# Patient Record
Sex: MALE | Race: AMERICAN INDIAN OR ALASKA NATIVE | NOT HISPANIC OR LATINO | URBAN - METROPOLITAN AREA
[De-identification: names, ages, dates, MRNs, and addresses within clinical notes are randomized per-mention and may not be internally consistent; named-entity substitution may affect disease eponyms.]

---

## 2018-09-08 ENCOUNTER — OUTPATIENT (OUTPATIENT)
Dept: OUTPATIENT SERVICES | Facility: HOSPITAL | Age: 48
LOS: 1 days | Discharge: HOME | End: 2018-09-08

## 2018-09-08 DIAGNOSIS — R80.9 PROTEINURIA, UNSPECIFIED: ICD-10-CM

## 2018-09-08 DIAGNOSIS — N18.2 CHRONIC KIDNEY DISEASE, STAGE 2 (MILD): ICD-10-CM

## 2019-03-13 ENCOUNTER — OUTPATIENT (OUTPATIENT)
Dept: OUTPATIENT SERVICES | Facility: HOSPITAL | Age: 49
LOS: 1 days | Discharge: HOME | End: 2019-03-13

## 2019-03-13 DIAGNOSIS — N18.1 CHRONIC KIDNEY DISEASE, STAGE 1: ICD-10-CM

## 2019-03-13 DIAGNOSIS — R80.9 PROTEINURIA, UNSPECIFIED: ICD-10-CM

## 2019-04-29 PROBLEM — Z00.00 ENCOUNTER FOR PREVENTIVE HEALTH EXAMINATION: Status: ACTIVE | Noted: 2019-04-29

## 2019-06-19 ENCOUNTER — APPOINTMENT (OUTPATIENT)
Dept: UROLOGY | Facility: CLINIC | Age: 49
End: 2019-06-19

## 2021-12-22 ENCOUNTER — OUTPATIENT (OUTPATIENT)
Dept: OUTPATIENT SERVICES | Facility: HOSPITAL | Age: 51
LOS: 1 days | Discharge: HOME | End: 2021-12-22
Payer: COMMERCIAL

## 2021-12-22 PROCEDURE — 78803 RP LOCLZJ TUM SPECT 1 AREA: CPT | Mod: 26

## 2021-12-22 PROCEDURE — 78306 BONE IMAGING WHOLE BODY: CPT | Mod: 26

## 2021-12-27 ENCOUNTER — OUTPATIENT (OUTPATIENT)
Dept: OUTPATIENT SERVICES | Facility: HOSPITAL | Age: 51
LOS: 1 days | Discharge: HOME | End: 2021-12-27
Payer: COMMERCIAL

## 2021-12-27 DIAGNOSIS — C61 MALIGNANT NEOPLASM OF PROSTATE: ICD-10-CM

## 2021-12-27 PROCEDURE — 74177 CT ABD & PELVIS W/CONTRAST: CPT | Mod: 26

## 2021-12-30 DIAGNOSIS — C61 MALIGNANT NEOPLASM OF PROSTATE: ICD-10-CM

## 2022-01-26 ENCOUNTER — NON-APPOINTMENT (OUTPATIENT)
Age: 52
End: 2022-01-26

## 2022-01-27 ENCOUNTER — APPOINTMENT (OUTPATIENT)
Dept: UROLOGY | Facility: CLINIC | Age: 52
End: 2022-01-27
Payer: COMMERCIAL

## 2022-01-27 VITALS — HEIGHT: 64 IN | BODY MASS INDEX: 24.75 KG/M2 | WEIGHT: 145 LBS | TEMPERATURE: 98 F

## 2022-01-27 DIAGNOSIS — Z78.9 OTHER SPECIFIED HEALTH STATUS: ICD-10-CM

## 2022-01-27 DIAGNOSIS — R39.9 UNSPECIFIED SYMPTOMS AND SIGNS INVOLVING THE GENITOURINARY SYSTEM: ICD-10-CM

## 2022-01-27 DIAGNOSIS — Z85.46 PERSONAL HISTORY OF MALIGNANT NEOPLASM OF PROSTATE: ICD-10-CM

## 2022-01-27 PROCEDURE — 99205 OFFICE O/P NEW HI 60 MIN: CPT

## 2022-01-27 NOTE — ASSESSMENT
[FreeTextEntry1] : LIZBETH SMITH is a 52 year old male who presents for consultation for intermediate risk prostate cancer w BPH/severe intravesical protrusion, bothersome LUTS.\par \par We discussed options focusing on surgical options and radiation.  Patient met with radiation specialist for proton beam and external beam and he declined any further consultation w rad onc. \par I explained my concern w regards to his significant BPH if he were to have radiation.  He is leaning towards surgery and while is deciding we will obtain an MRI of the prostate.\par Have pathology slides read in [facility]\par Follow-up after\par \par \par Our discussion summarized --\par The natural history of this disease was explained to the patient at length and the treatment options discussed including radical prostatectomy by open or robotic-assisted laparoscopic approach, external-beam radiotherapy, brachytherapy, and watchful waiting, including the probability of success and complications associated with these approaches.\par \par With respect to AS/watchful waiting, we discussed the difficulties of estimating the extent of disease preoperatively, the risk of cancer progression, and risk that salvage might not be possible with progression. \par However, the favorable long-term outcomes of active surveillance in appropriately selected patients was conveyed. \par \par With respect to seed implants, we discussed risks including but not limited to cancer recurrence, exacerbation of voiding symptoms or hematuria, urinary retention, induction of 2nd malignancy, and risks of rectal symptoms or bleeding.  We also discussed risks of the anesthesia including but not limited to MI, CVA, DVT, and PE.  \par With respect to EBRT, we discussed we discussed risks including but not limited to cancer recurrence, exacerbation of voiding symptoms or hematuria, urinary retention, incontinence, stricture of the urinary tract, induction of 2nd malignancy, and risks of rectal symptoms or bleeding.  We discussed fact that rectal symptoms may be more common with EBRT than with other treatment modalities. I did convey that the risk of erectile dysfunction was likely lower with EBRT, at least in the short-term.\par \par With radiation therapy, we discussed the difficulties in diagnosing recurrent disease at an early stage due to variability in PSA levels and the fact that most patients are not candidates for local salvage therapy when biochemical recurrence is declared.  We also discussed the significant morbidity of local salvage therapy in terms of perioperative complications, erectile dysfunction and urinary incontinence.\par \par With respect to radical prostatectomy, the pros and cons of open vs robotic-assisted laparoscopic prostatectomy were discussed. The complications of this procedure were reviewed with the patient and include (but not limited to) urinary incontinence, erectile dysfunction, infertility, anastomotic stricture, lymphocele, hemorrhage requiring transfusion, rectal, ureteral, or nerve injury, infection, cardiovascular, pulmonary, thromboembolic, and anesthetic complications.  For robotic prostatectomy, the additional complications of anastomotic urine leak and small bowel obstruction from adhesions was conveyed. We also discussed the need for a urethral catheter as well as a ANGEL drain post-operatively.\par \par With surgery, we also discussed the potential advantage over radiation therapy in that biochemical recurrence can be detected at a relatively earlier stage and that salvage radiotherapy is successful in controlling recurrent disease in a substantial proportion of patients.  I also conveyed that salvage radiotherapy was associated with a considerably more favorable morbidity profile compared to local salvage therapies for radiorecurrent disease. \par \par We also discussed prostate cryotherapy in detail, including aspects related to the procedure and the outcomes with respect to cancer control, urinary dysfunction, impotence, and morbidity.\par \par All of the patient questions were answered.  \par \par \par \par \par

## 2022-01-27 NOTE — HISTORY OF PRESENT ILLNESS
[FreeTextEntry1] : LIZBETH SMITH is a 52 year old male who presents for consultation for intermediate risk prostate cancer.\par \par This was found as patient had an elevated PSA 4.27.  Does report bothersome lower urinary tract symptoms weak stream with hesitancy nocturia 3 times.\par Denies gross hematuria, dysuria or associated symptoms. \par \par Pathology [place] 3+4 prostate cancer 4 of 12 cores positive unable to read poorly scanned document with regards to percentage of core.  TRUS prostate biopsy December 2021\par \par Records reviewed\par 85 g prostate\par Decipher score low risk\par \par Bone scan negative for metastatic disease, questionable site of uptake with subsequent negative x-ray, x-ray shows benign sclerotic density distal right femur\par CT abdomen pelvis images visualized negative for metastatic disease severe intravesical prostatic protrusion hydronephrosis bilaterally\par \par Denies  PMH including previous kidney stones, recurrent UTIs. 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

## 2022-01-28 ENCOUNTER — NON-APPOINTMENT (OUTPATIENT)
Age: 52
End: 2022-01-28

## 2022-02-04 ENCOUNTER — APPOINTMENT (OUTPATIENT)
Dept: UROLOGY | Facility: CLINIC | Age: 52
End: 2022-02-04

## 2022-02-07 ENCOUNTER — APPOINTMENT (OUTPATIENT)
Dept: RADIATION ONCOLOGY | Facility: CLINIC | Age: 52
End: 2022-02-07
Payer: COMMERCIAL

## 2022-02-07 DIAGNOSIS — Z80.3 FAMILY HISTORY OF MALIGNANT NEOPLASM OF BREAST: ICD-10-CM

## 2022-02-07 PROCEDURE — 99024 POSTOP FOLLOW-UP VISIT: CPT

## 2022-02-07 RX ORDER — TAMSULOSIN HYDROCHLORIDE 0.4 MG/1
0.4 CAPSULE ORAL
Qty: 90 | Refills: 3 | Status: ACTIVE | COMMUNITY
Start: 2022-02-07 | End: 1900-01-01

## 2022-02-07 NOTE — REVIEW OF SYSTEMS
[Nocturia] : nocturia [Urinary Frequency] : urinary frequency [Negative] : Psychiatric [Hematuria: Grade 0] : Hematuria: Grade 0 [Urinary Incontinence: Grade 0] : Urinary Incontinence: Grade 0  [Urinary Retention: Grade 1 - Urinary, suprapubic or intermittent catheter placement not indicated; able to void with some residual] : Urinary Retention: Grade 1 - Urinary, suprapubic or intermittent catheter placement not indicated; able to void with some residual [Urinary Tract Pain: Grade 0] : Urinary Tract Pain: Grade 0 [Urinary Urgency: Grade 1 - Present] : Urinary Urgency: Grade 1 - Present [Urinary Frequency: Grade 1 - Present] : Urinary Frequency: Grade 1 - Present [Ejaculation Disorder: Grade 0] : Ejaculation Disorder: Grade 0 [Erectile Dysfunction: Grade 0] : Erectile Dysfunction: Grade 0

## 2022-02-07 NOTE — HISTORY OF PRESENT ILLNESS
[Home] : at home, [unfilled] , at the time of the visit. [Medical Office: ([facility])___] : at the medical office located in  [Verbal consent obtained from patient] : the patient, [unfilled] [FreeTextEntry1] : Mr. Boogie is a 52 year old male with Grand View score 3+4=7 adenocarcinoma of the prostate.  He was referred by Dr. Pedraza who patient consulted on 1/27/22 for elevated PSA of 4.27 ng/ml and prostate cancer diagnosis. \par \par Patient underwent prostate biopsy on 12/6/21, pathology report indicated 4 out of 12 cores positive for cancer with [place] score 3+4 =7 in all cores, involving 3%-7% of the tissue.  Prostate volume 85 g.\par \par Bone scan was done 12/22/21 which shows punctate increased uptake in the distal right femoral diaphysis. X-Ray of the right femur recommended.  Two sites of irregular shaped increased uptake in the right frontal bone and 1 site in the left parietal bone. X-Ray of skull is suggested.  These may represent contamination with radioactive urine (via the finger).  No other potentially suspicious abnormalities was noted.  \par \par CT of abdomen/pelvis done on 12/27/21 showed no evidence of metastatic disease or lymphadenopathy.  Marked prostatomegaly was noted.  \par \par Decipher score was 0.21 \par \par Patient presents today to discuss his radiation therapy option. Patient report nocturia 2-3 times per night, urgency, hesitancy, daytime frequency, weak urinary flow, and feeling of not emptying his bladder completely. He denies dysuria, and hematuria. He is not sexually active but no issues with his erection. He has regular daily bowel function. Patient report that his femoral and skull x-rays are negative.\par

## 2022-02-07 NOTE — DISEASE MANAGEMENT
[1] : T1 [c] : c [0] : M0 [0-10] : 0 -10 ng/mL [Biopsy] : Patient had a biopsy on [7(3+4)] : Template Biopsy Broadbent Score: 7(3+4) [] : Patient had a bone scan [IIB] : IIB [BiopsyDate] : 12/2021 [TotalCores] : 12 [TotalPositiveCores] : 4 [MaxCoreInvolvement] : 7 [CTresults] : 12/27/21 [BoneScanResults] : 12/22/21

## 2022-02-15 ENCOUNTER — APPOINTMENT (OUTPATIENT)
Dept: RADIATION ONCOLOGY | Facility: HOSPITAL | Age: 52
End: 2022-02-15
Payer: COMMERCIAL

## 2022-02-15 ENCOUNTER — OUTPATIENT (OUTPATIENT)
Dept: OUTPATIENT SERVICES | Facility: HOSPITAL | Age: 52
LOS: 1 days | Discharge: HOME | End: 2022-02-15

## 2022-02-15 VITALS
SYSTOLIC BLOOD PRESSURE: 131 MMHG | RESPIRATION RATE: 16 BRPM | WEIGHT: 149.38 LBS | HEART RATE: 69 BPM | OXYGEN SATURATION: 100 % | BODY MASS INDEX: 25.5 KG/M2 | DIASTOLIC BLOOD PRESSURE: 82 MMHG | TEMPERATURE: 97.3 F | HEIGHT: 64 IN

## 2022-02-15 DIAGNOSIS — C61 MALIGNANT NEOPLASM OF PROSTATE: ICD-10-CM

## 2022-02-15 PROCEDURE — 99214 OFFICE O/P EST MOD 30 MIN: CPT

## 2022-02-15 RX ORDER — PNV NO.95/FERROUS FUM/FOLIC AC 28MG-0.8MG
TABLET ORAL
Refills: 0 | Status: ACTIVE | COMMUNITY

## 2022-02-15 RX ORDER — ASCORBIC ACID 500 MG
TABLET ORAL
Refills: 0 | Status: ACTIVE | COMMUNITY

## 2022-02-15 RX ORDER — CHROMIUM 200 MCG
TABLET ORAL
Refills: 0 | Status: ACTIVE | COMMUNITY

## 2022-02-15 RX ORDER — TURMERIC ROOT EXTRACT 500 MG
TABLET ORAL
Refills: 0 | Status: ACTIVE | COMMUNITY

## 2022-02-16 PROBLEM — C61 PROSTATE CANCER: Status: ACTIVE | Noted: 2022-01-27

## 2022-02-16 NOTE — HISTORY OF PRESENT ILLNESS
[FreeTextEntry1] : Daniel was seen in a follow-up visit for his prostate cancer.  He has now seen three urologists and I am the fourth radiation oncologist.  I was asked by Dr. Ovalles to continue with his evaluation as he lives on [address].  In summary:\par -he is a 52 year old with a PSA of 4.2, who underwent a TRUS guided biopsy of the prostate in December 2021.\par -this found [place] 3+4 cancer in four cores from the left.  \par -the Decipher assay rated his genomic risk as low with a 15 yr risk of prostate cancer mortality of 1.8% and < 1% risk of metastasis.  \par -his gland measured 85 cc on MRI (see CT above)\par -he has a longstanding history of LUTS\par -only recently has he started taking tamsulosin, with with some benefit\par \par He is here to continue his debate about choice of treatment.

## 2022-02-16 NOTE — REVIEW OF SYSTEMS
[Patient Intake Form Reviewed] : Patient intake form was reviewed [Wheezing] : wheezing [Negative] : Allergic/Immunologic [EPIC-CP Score (0-60): ___] : EPIC-CP score: [unfilled] [FreeTextEntry4] : sinus problems [FreeTextEntry6] : asthma

## 2022-02-16 NOTE — PHYSICAL EXAM
[Normal] : oriented to person, place and time, the affect was normal, the mood was normal and not anxious [FreeTextEntry1] : rectal exam was deferred

## 2022-03-10 ENCOUNTER — APPOINTMENT (OUTPATIENT)
Dept: UROLOGY | Facility: CLINIC | Age: 52
End: 2022-03-10

## 2022-08-30 ENCOUNTER — APPOINTMENT (OUTPATIENT)
Dept: PLASTIC SURGERY | Facility: CLINIC | Age: 52
End: 2022-08-30

## 2022-08-30 VITALS — BODY MASS INDEX: 24.75 KG/M2 | HEIGHT: 64 IN | WEIGHT: 145 LBS

## 2022-08-30 PROCEDURE — 99203 OFFICE O/P NEW LOW 30 MIN: CPT

## 2022-08-30 NOTE — PHYSICAL EXAM
[de-identified] : well developed male, NAD [de-identified] : NC/AT [de-identified] : unlabored breathing [de-identified] : JMR [de-identified] : soft, nontender  [de-identified] : Forehead - left superior forehead with 2.5 x o.7 cm raised, brown skin lesion c/w nevus sebaceous in appearance, right lateral eyebrow with healing biopsy site

## 2022-08-30 NOTE — ASSESSMENT
[FreeTextEntry1] : 53 yo M  with forehead skin lesions indicated for excision. \par \par - recommend excision of both lesions in TRAY\par \par Regarding the procedure, we discussed scarring, poor wound healing, bleeding, infection, need for additional surgery, and dissatisfaction with the outcome.  Also discussed possibility of keloid and/or hypertrophic scar formation as well as recurrence.  All questions were answered and risks understood.\par \par Due to COVID 19, pre-visit patient instructions were explained to the patient and their symptoms were checked upon arrival.  \par Masks were used by the health care providers and staff and the examination room was cleaned after the patient visit was completed.\par

## 2022-08-30 NOTE — HISTORY OF PRESENT ILLNESS
[FreeTextEntry1] : 53 yo M with PMHx of Prostate Ca s/p prostatectomy who presents today for evaluation of forehead skin lesions x2. 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 Right forehead mass is new and recent biopsy revealed syringocystadenoma with positive margins. Pt presents today to discuss complete excision. Denies any personal or family h/o skin cancer. \par \par \par Occupation -  \par Nonsmoker

## 2022-09-16 ENCOUNTER — LABORATORY RESULT (OUTPATIENT)
Age: 52
End: 2022-09-16

## 2022-10-05 ENCOUNTER — OUTPATIENT (OUTPATIENT)
Dept: OUTPATIENT SERVICES | Facility: HOSPITAL | Age: 52
LOS: 1 days | Discharge: HOME | End: 2022-10-05

## 2022-10-05 VITALS
OXYGEN SATURATION: 99 % | HEIGHT: 64 IN | SYSTOLIC BLOOD PRESSURE: 126 MMHG | TEMPERATURE: 98 F | HEART RATE: 80 BPM | DIASTOLIC BLOOD PRESSURE: 75 MMHG | RESPIRATION RATE: 14 BRPM | WEIGHT: 145.06 LBS

## 2022-10-05 DIAGNOSIS — Z01.818 ENCOUNTER FOR OTHER PREPROCEDURAL EXAMINATION: ICD-10-CM

## 2022-10-05 DIAGNOSIS — D48.5 NEOPLASM OF UNCERTAIN BEHAVIOR OF SKIN: ICD-10-CM

## 2022-10-05 DIAGNOSIS — Z90.79 ACQUIRED ABSENCE OF OTHER GENITAL ORGAN(S): Chronic | ICD-10-CM

## 2022-10-05 LAB
ALBUMIN SERPL ELPH-MCNC: 5 G/DL — SIGNIFICANT CHANGE UP (ref 3.5–5.2)
ALP SERPL-CCNC: 95 U/L — SIGNIFICANT CHANGE UP (ref 30–115)
ALT FLD-CCNC: 13 U/L — SIGNIFICANT CHANGE UP (ref 0–41)
ANION GAP SERPL CALC-SCNC: 12 MMOL/L — SIGNIFICANT CHANGE UP (ref 7–14)
APTT BLD: 38.9 SEC — SIGNIFICANT CHANGE UP (ref 27–39.2)
AST SERPL-CCNC: 17 U/L — SIGNIFICANT CHANGE UP (ref 0–41)
BASOPHILS # BLD AUTO: 0.07 K/UL — SIGNIFICANT CHANGE UP (ref 0–0.2)
BASOPHILS NFR BLD AUTO: 1.4 % — HIGH (ref 0–1)
BILIRUB SERPL-MCNC: 0.5 MG/DL — SIGNIFICANT CHANGE UP (ref 0.2–1.2)
BUN SERPL-MCNC: 17 MG/DL — SIGNIFICANT CHANGE UP (ref 10–20)
CALCIUM SERPL-MCNC: 9.4 MG/DL — SIGNIFICANT CHANGE UP (ref 8.4–10.5)
CHLORIDE SERPL-SCNC: 102 MMOL/L — SIGNIFICANT CHANGE UP (ref 98–110)
CO2 SERPL-SCNC: 26 MMOL/L — SIGNIFICANT CHANGE UP (ref 17–32)
CREAT SERPL-MCNC: 1.1 MG/DL — SIGNIFICANT CHANGE UP (ref 0.7–1.5)
EGFR: 81 ML/MIN/1.73M2 — SIGNIFICANT CHANGE UP
EOSINOPHIL # BLD AUTO: 0.14 K/UL — SIGNIFICANT CHANGE UP (ref 0–0.7)
EOSINOPHIL NFR BLD AUTO: 2.8 % — SIGNIFICANT CHANGE UP (ref 0–8)
GLUCOSE SERPL-MCNC: 107 MG/DL — HIGH (ref 70–99)
HCT VFR BLD CALC: 45.2 % — SIGNIFICANT CHANGE UP (ref 42–52)
HGB BLD-MCNC: 15.2 G/DL — SIGNIFICANT CHANGE UP (ref 14–18)
IMM GRANULOCYTES NFR BLD AUTO: 0.2 % — SIGNIFICANT CHANGE UP (ref 0.1–0.3)
INR BLD: 1 RATIO — SIGNIFICANT CHANGE UP (ref 0.65–1.3)
LYMPHOCYTES # BLD AUTO: 1.53 K/UL — SIGNIFICANT CHANGE UP (ref 1.2–3.4)
LYMPHOCYTES # BLD AUTO: 30.4 % — SIGNIFICANT CHANGE UP (ref 20.5–51.1)
MCHC RBC-ENTMCNC: 29.4 PG — SIGNIFICANT CHANGE UP (ref 27–31)
MCHC RBC-ENTMCNC: 33.6 G/DL — SIGNIFICANT CHANGE UP (ref 32–37)
MCV RBC AUTO: 87.4 FL — SIGNIFICANT CHANGE UP (ref 80–94)
MONOCYTES # BLD AUTO: 0.52 K/UL — SIGNIFICANT CHANGE UP (ref 0.1–0.6)
MONOCYTES NFR BLD AUTO: 10.3 % — HIGH (ref 1.7–9.3)
NEUTROPHILS # BLD AUTO: 2.76 K/UL — SIGNIFICANT CHANGE UP (ref 1.4–6.5)
NEUTROPHILS NFR BLD AUTO: 54.9 % — SIGNIFICANT CHANGE UP (ref 42.2–75.2)
NRBC # BLD: 0 /100 WBCS — SIGNIFICANT CHANGE UP (ref 0–0)
PLATELET # BLD AUTO: 297 K/UL — SIGNIFICANT CHANGE UP (ref 130–400)
POTASSIUM SERPL-MCNC: 4.4 MMOL/L — SIGNIFICANT CHANGE UP (ref 3.5–5)
POTASSIUM SERPL-SCNC: 4.4 MMOL/L — SIGNIFICANT CHANGE UP (ref 3.5–5)
PROT SERPL-MCNC: 6.7 G/DL — SIGNIFICANT CHANGE UP (ref 6–8)
PROTHROM AB SERPL-ACNC: 11.4 SEC — SIGNIFICANT CHANGE UP (ref 9.95–12.87)
RBC # BLD: 5.17 M/UL — SIGNIFICANT CHANGE UP (ref 4.7–6.1)
RBC # FLD: 13.3 % — SIGNIFICANT CHANGE UP (ref 11.5–14.5)
SODIUM SERPL-SCNC: 140 MMOL/L — SIGNIFICANT CHANGE UP (ref 135–146)
WBC # BLD: 5.03 K/UL — SIGNIFICANT CHANGE UP (ref 4.8–10.8)
WBC # FLD AUTO: 5.03 K/UL — SIGNIFICANT CHANGE UP (ref 4.8–10.8)

## 2022-10-05 PROCEDURE — 93010 ELECTROCARDIOGRAM REPORT: CPT

## 2022-10-05 NOTE — H&P PST ADULT - HISTORY OF PRESENT ILLNESS
_52  yo male presents for PAST in preparation for EXCISION OF RIGHT AND LEFT FOREHEAD LESIONS  Pt complains of past medical hiostory of prostate cancer, no chemo or radiation was necessary ( proctectomy done 3/4/22), now he is scheduled for moles removal. Per pt they are "not cancerous"    Denies any chest pain, difficulty breathing, SOB, palpitations, dysuria, URI, or any other infections in the last 2 weeks/1 month. Denies any recent travel, contact, or exposure to any persons with known or suspected COVID-19. Pt also denies COVID testing within the last 2 weeks. Pt advised to self quarantine until day of procedure. Exercise tolerance of 2-3 flights of stairs without dyspnea. VOLODYMYR reviewed with patient.  Pt states that he runs  daily -5 miles.   Anesthesia Alert  NO--Difficult Airway  NO--History of neck surgery or radiation  NO--Limited ROM of neck  NO--History of Malignant hyperthermia  NO--Personal or family history of Pseudocholinesterase deficiency.  NO--Prior Anesthesia Complication  NO--Latex Allergy  NO--Loose teeth  NO--History of Rheumatoid Arthritis  YES--VOLODYMYR  NO--Bleeding risk  NO--Other   written and verbal instructions with teach back on chlorhexidine shampoo provided,  pt verbalized understanding with returned demonstration  Patient verbalized understanding of instructions and was given the opportunity to ask questions and have them answered.   _52  yo male presents for PAST in preparation for EXCISION OF RIGHT AND LEFT FOREHEAD LESIONS  Pt complains of past medical history of prostate cancer, no chemo or radiation was necessary ( proctectomy done 3/4/22), now he is scheduled for moles removal. Per pt the moles are "not cancerous" but can become if not removed.    Denies any chest pain, difficulty breathing, SOB, palpitations, dysuria, URI, or any other infections in the last 2 weeks/1 month. Denies any recent travel, contact, or exposure to any persons with known or suspected COVID-19. Pt also denies COVID testing within the last 2 weeks. Pt advised to self quarantine until day of procedure. Exercise tolerance of 2-3 flights of stairs without dyspnea. VOLODYMYR reviewed with patient.  Pt states that he runs  daily -5 miles.   Anesthesia Alert  NO--Difficult Airway  NO--History of neck surgery or radiation  NO--Limited ROM of neck  NO--History of Malignant hyperthermia  NO--Personal or family history of Pseudocholinesterase deficiency.  NO--Prior Anesthesia Complication  NO--Latex Allergy  NO--Loose teeth  NO--History of Rheumatoid Arthritis  YES--VOLODYMYR  NO--Bleeding risk  NO--Other   written and verbal instructions with teach back on chlorhexidine shampoo provided,  pt verbalized understanding with returned demonstration  Patient verbalized understanding of instructions and was given the opportunity to ask questions and have them answered.

## 2022-10-05 NOTE — H&P PST ADULT - REASON FOR ADMISSION
Case Type: OP Block TimeSuite: CASProceduralist: Daniele Fabian  Confirmed Surgery DateTime: 10-  Procedure: EXCISION OF RIGHT AND LEFT FOREHEAD LESIONS  Laterality: BilateralLength of Procedure: 90 Minutes  Anesthesia Type: General

## 2022-10-05 NOTE — H&P PST ADULT - NSICDXFAMILYHX_GEN_ALL_CORE_FT
FAMILY HISTORY:  Mother  Still living? Yes, Estimated age: 71-80  Family history of diabetes mellitus (DM), Age at diagnosis: Age Unknown

## 2022-10-24 ENCOUNTER — LABORATORY RESULT (OUTPATIENT)
Age: 52
End: 2022-10-24

## 2022-10-26 NOTE — ASU PATIENT PROFILE, ADULT - FALL HARM RISK - UNIVERSAL INTERVENTIONS
Bed in lowest position, wheels locked, appropriate side rails in place/Call bell, personal items and telephone in reach/Instruct patient to call for assistance before getting out of bed or chair/Non-slip footwear when patient is out of bed/[place] to call system/Physically safe environment - no spills, clutter or unnecessary equipment/Purposeful Proactive Rounding/Room/bathroom lighting operational, light cord in reach

## 2022-10-27 ENCOUNTER — RESULT REVIEW (OUTPATIENT)
Age: 52
End: 2022-10-27

## 2022-10-27 ENCOUNTER — APPOINTMENT (OUTPATIENT)
Dept: PLASTIC SURGERY | Facility: AMBULATORY SURGERY CENTER | Age: 52
End: 2022-10-27

## 2022-10-27 ENCOUNTER — TRANSCRIPTION ENCOUNTER (OUTPATIENT)
Age: 52
End: 2022-10-27

## 2022-10-27 ENCOUNTER — OUTPATIENT (OUTPATIENT)
Dept: OUTPATIENT SERVICES | Facility: HOSPITAL | Age: 52
LOS: 1 days | Discharge: HOME | End: 2022-10-27

## 2022-10-27 VITALS
DIASTOLIC BLOOD PRESSURE: 63 MMHG | SYSTOLIC BLOOD PRESSURE: 114 MMHG | RESPIRATION RATE: 16 BRPM | HEART RATE: 64 BPM | OXYGEN SATURATION: 96 %

## 2022-10-27 VITALS
SYSTOLIC BLOOD PRESSURE: 114 MMHG | WEIGHT: 143.08 LBS | DIASTOLIC BLOOD PRESSURE: 78 MMHG | HEIGHT: 64 IN | OXYGEN SATURATION: 99 % | RESPIRATION RATE: 19 BRPM | TEMPERATURE: 98 F | HEART RATE: 73 BPM

## 2022-10-27 DIAGNOSIS — Z90.79 ACQUIRED ABSENCE OF OTHER GENITAL ORGAN(S): Chronic | ICD-10-CM

## 2022-10-27 PROCEDURE — 14040 TIS TRNFR F/C/C/M/N/A/G/H/F: CPT

## 2022-10-27 PROCEDURE — 11444 EXC FACE-MM B9+MARG 3.1-4 CM: CPT | Mod: 59

## 2022-10-27 PROCEDURE — 88305 TISSUE EXAM BY PATHOLOGIST: CPT | Mod: 26

## 2022-10-27 PROCEDURE — 13132 CMPLX RPR F/C/C/M/N/AX/G/H/F: CPT | Mod: 59

## 2022-10-27 RX ORDER — TRAMADOL HYDROCHLORIDE 50 MG/1
1 TABLET ORAL
Qty: 12 | Refills: 0
Start: 2022-10-27 | End: 2022-10-30

## 2022-10-27 RX ORDER — HYDROMORPHONE HYDROCHLORIDE 2 MG/ML
0.5 INJECTION INTRAMUSCULAR; INTRAVENOUS; SUBCUTANEOUS
Refills: 0 | Status: DISCONTINUED | OUTPATIENT
Start: 2022-10-27 | End: 2022-10-27

## 2022-10-27 RX ORDER — ONDANSETRON 8 MG/1
4 TABLET, FILM COATED ORAL ONCE
Refills: 0 | Status: DISCONTINUED | OUTPATIENT
Start: 2022-10-27 | End: 2022-11-10

## 2022-10-27 RX ORDER — SODIUM CHLORIDE 9 MG/ML
1000 INJECTION, SOLUTION INTRAVENOUS
Refills: 0 | Status: DISCONTINUED | OUTPATIENT
Start: 2022-10-27 | End: 2022-11-10

## 2022-10-27 RX ORDER — ACETAMINOPHEN 500 MG
650 TABLET ORAL ONCE
Refills: 0 | Status: COMPLETED | OUTPATIENT
Start: 2022-10-27 | End: 2022-10-27

## 2022-10-27 RX ADMIN — Medication 650 MILLIGRAM(S): at 12:32

## 2022-10-27 NOTE — ASU DISCHARGE PLAN (ADULT/PEDIATRIC) - CALL YOUR DOCTOR IF YOU HAVE ANY OF THE FOLLOWING:
General: no weakness, no fatigue  Neck: Nontender  Respiratory: No cough, no shortness of breath  Cardiac: Negative  GI: No abdominal distension, no diarrhea, no vomiting, no nausea, no constipation  Extremities: No swelling Bleeding that does not stop/Swelling that gets worse/Pain not relieved by Medications/Wound/Surgical Site with redness, or foul smelling discharge or pus/Numbness, tingling, color or temperature change to extremity

## 2022-10-27 NOTE — ASU DISCHARGE PLAN (ADULT/PEDIATRIC) - ASU DC SPECIAL INSTRUCTIONSFT
You may shower from the neck down. Please leave the dressings in place to fall off on their own. When the dressing falls off, you can apply bacitracin twice a day to the incisions.     Please take over the counter medications for pain and Tramadol for severe pain as needed.    Please see Dr. Fabian in 1 week for follow up.

## 2022-10-27 NOTE — BRIEF OPERATIVE NOTE - NSICDXBRIEFPROCEDURE_GEN_ALL_CORE_FT
PROCEDURES:  Excision, lesion, benign, scalp, greater than 5.0 cm in diameter 27-Oct-2022 13:53:50  Isael Acosta

## 2022-10-27 NOTE — ASU DISCHARGE PLAN (ADULT/PEDIATRIC) - CARE PROVIDER_API CALL
Daniele Fabian)  Plastic Surgery; Surgery of the Hand  [address] [place] [address], [address]  [place] [address]  Phone: [phone]  Fax: [phone]  Follow Up Time: 1 week

## 2022-10-27 NOTE — ASU DISCHARGE PLAN (ADULT/PEDIATRIC) - NS MD DC FALL RISK RISK
For information on Fall & Injury Prevention, visit: [email or web address] OR  [email or web address] OR  https://www.cdc.gov/steadi/patient.html

## 2022-10-31 LAB — SURGICAL PATHOLOGY STUDY: SIGNIFICANT CHANGE UP

## 2022-11-01 DIAGNOSIS — D23.39 OTHER BENIGN NEOPLASM OF SKIN OF OTHER PARTS OF FACE: ICD-10-CM

## 2022-11-01 DIAGNOSIS — Z90.79 ACQUIRED ABSENCE OF OTHER GENITAL ORGAN(S): ICD-10-CM

## 2022-11-01 DIAGNOSIS — G47.33 OBSTRUCTIVE SLEEP APNEA (ADULT) (PEDIATRIC): ICD-10-CM

## 2022-11-01 DIAGNOSIS — Z85.46 PERSONAL HISTORY OF MALIGNANT NEOPLASM OF PROSTATE: ICD-10-CM

## 2022-11-01 DIAGNOSIS — D22.39 MELANOCYTIC NEVI OF OTHER PARTS OF FACE: ICD-10-CM

## 2022-11-02 ENCOUNTER — APPOINTMENT (OUTPATIENT)
Dept: PLASTIC SURGERY | Facility: CLINIC | Age: 52
End: 2022-11-02

## 2022-11-02 PROBLEM — C61 MALIGNANT NEOPLASM OF PROSTATE: Chronic | Status: ACTIVE | Noted: 2022-10-05

## 2022-11-02 PROBLEM — G47.33 OBSTRUCTIVE SLEEP APNEA (ADULT) (PEDIATRIC): Chronic | Status: ACTIVE | Noted: 2022-10-05

## 2022-11-02 NOTE — HISTORY OF PRESENT ILLNESS
[FreeTextEntry1] : 53 yo M with PMHx of Prostate Ca s/p prostatectomy who presents today for evaluation of forehead skin lesions x2. 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 Right forehead mass is new and recent biopsy revealed syringocystadenoma with positive margins. Pt presents today to discuss complete excision. Denies any personal or family h/o skin cancer. \par \par \par Occupation -  \par Nonsmoker \par \par Interval hx (11/2/22): Pt is POD# 6 s/p excision of 2x forehead lesions (per path, Salpingocystadenoma in background of nevus). Doing well. Denies fever/chills/drainage from incision sites. Pt had significant n/v after general anesthesia, now resolved.

## 2022-11-02 NOTE — PHYSICAL EXAM
[de-identified] : well developed male, NAD [de-identified] : NC/AT [de-identified] : unlabored breathing [de-identified] : JMR [de-identified] : soft, nontender  [de-identified] : Forehead - left superior forehead incision & right lateral eyebrow incision extending to R lateral canthus are both CDI, skin very well approximated, no seroma/hematoma/cellulitis or active drainage. Running suture in place

## 2022-11-02 NOTE — ASSESSMENT
[FreeTextEntry1] : 53 yo M Chapis choi with forehead skin lesions indicated for excision. \par \par  Pt is POD# 6 s/p excision of 2x forehead lesions (per path, Salpingocystadenoma in background of nevus). Doing very well \par \par - D/c sutures\par - All bandages changed, steri strips placed\par - Scar management: Aquaphor once steris fall off\par - Daily SPF\par - signs and symptoms of infection reviewed\par - No heavy lifting/pushing/pulling > 10lbs until 6 weeks from surgery\par - No cardiovascular activity / inc heart rate until 3 weeks from surgery\par - Path reviewed: Will discuss results with Pathologist and . Adv pt margins clear however would like to confirm final dx\par - All post op instructions reviewed, all questions answered\par - f/u 2 weeks for scar management\par \par \par Seen with SERGIO Cavanaugh \par \par Due to COVID 19, pre-visit patient instructions were explained to the patient and their symptoms were checked upon arrival.  \par Masks were used by the health care providers and staff and the examination room was cleaned after the patient visit was completed.\par

## 2022-11-21 ENCOUNTER — APPOINTMENT (OUTPATIENT)
Dept: PLASTIC SURGERY | Facility: CLINIC | Age: 52
End: 2022-11-21

## 2022-11-21 DIAGNOSIS — D23.9 OTHER BENIGN NEOPLASM OF SKIN, UNSPECIFIED: ICD-10-CM

## 2022-11-21 PROCEDURE — 99024 POSTOP FOLLOW-UP VISIT: CPT

## 2022-11-21 NOTE — ASSESSMENT
[FreeTextEntry1] : 51 yo M  with forehead skin lesions indicated for excision. \par \par Pt is 1 month s/p excision of 2x forehead syringocystadenoma papilliferum. Doing very well. \par \par - Scar management:Ok to start scar guard daily\par - Daily SPF\par - Recommended establish care with dermatology just for routine f/us\par - signs and symptoms of infection reviewed\par - Cleared for activity\par - Path reviewed again, print out given to pt \par - All post op instructions reviewed, all questions answered\par - f/u 6 months with \par \par Due to COVID 19, pre-visit patient instructions were explained to the patient and their symptoms were checked upon arrival.  \par Masks were used by the health care providers and staff and the examination room was cleaned after the patient visit was completed.\par

## 2022-11-21 NOTE — HISTORY OF PRESENT ILLNESS
[FreeTextEntry1] : 51 yo M with PMHx of Prostate Ca s/p prostatectomy who presents today for evaluation of forehead skin lesions x2. 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 Right forehead mass is new and recent biopsy revealed syringocystadenoma with positive margins. Pt presents today to discuss complete excision. Denies any personal or family h/o skin cancer. \par \par \par Occupation -  \par Nonsmoker \par \par Interval hx (11/2/22): Pt is POD# 6 s/p excision of 2x forehead lesions (per path, Salpingocystadenoma in background of nevus). Doing well. Denies fever/chills/drainage from incision sites. Pt had significant n/v after general anesthesia, now resolved. \par \par Interval hx (11/21/22): Pt is 1 month s/p excision of 2x forehead lesions (per UPDATED path after s/w pathologist, pt with benign Syringocystadenoma papilliferum). Doing well, using aquaphor to incisions daily. No fever/chills or drainage.

## 2022-11-21 NOTE — PHYSICAL EXAM
[de-identified] : well developed male, NAD [de-identified] : NC/AT [de-identified] : unlabored breathing [de-identified] : JMR [de-identified] : soft, nontender  [de-identified] : Forehead - left superior forehead incision & right lateral eyebrow incision extending to R lateral canthus are both CDI, skin very well approximated & healing well without any issues to note. No open areas, no signs of infection

## 2023-05-25 ENCOUNTER — APPOINTMENT (OUTPATIENT)
Dept: PLASTIC SURGERY | Facility: CLINIC | Age: 53
End: 2023-05-25
Payer: COMMERCIAL

## 2023-05-25 PROCEDURE — 99212 OFFICE O/P EST SF 10 MIN: CPT

## 2023-05-25 NOTE — ASSESSMENT
[FreeTextEntry1] : 51 yo M  with forehead skin lesions indicated for excision. \par \par Pt is 1 month s/p excision of 2x forehead syringocystadenoma papilliferum. Doing very well. \par \par - Scar management:Ok to start scar guard daily\par - Daily SPF\par - Recommended establish care with dermatology just for routine f/us\par - signs and symptoms of infection reviewed\par - Cleared for activity\par - Path reviewed again, print out given to pt \par - All post op instructions reviewed, all questions answered\par - f/u 6 months with \par \par Due to COVID 19, pre-visit patient instructions were explained to the patient and their symptoms were checked upon arrival.  \par Masks were used by the health care providers and staff and the examination room was cleaned after the patient visit was completed.\par \par \par 5/25/2023\par as above\par right and left forehead scars fine\par no recurrence\par \par plan exicse rigth cheek pigmented keratosis in fall\par \par to see derm prior\par \par Regarding the procedure, we discussed scarring, poor wound healing, bleeding, infection, need for additional surgery, and dissatisfaction with the outcome.  Also discussed possibility of keloid and/or hypertrophic scar formation as well as recurrence.  All questions were answered and risks understood.\par \par Wound care instructions given.\par

## 2023-05-25 NOTE — PHYSICAL EXAM
[de-identified] : well developed male, NAD [de-identified] : NC/AT [de-identified] : unlabored breathing [de-identified] : JMR [de-identified] : soft, nontender  [de-identified] : Forehead - left superior forehead incision & right lateral eyebrow incision extending to R lateral canthus are both CDI, skin very well approximated & healing well without any issues to note. No open areas, no signs of infection

## 2023-10-20 ENCOUNTER — APPOINTMENT (OUTPATIENT)
Dept: PLASTIC SURGERY | Facility: CLINIC | Age: 53
End: 2023-10-20

## 2023-10-26 ENCOUNTER — APPOINTMENT (OUTPATIENT)
Dept: PLASTIC SURGERY | Facility: CLINIC | Age: 53
End: 2023-10-26